# Patient Record
Sex: FEMALE | Race: WHITE
[De-identification: names, ages, dates, MRNs, and addresses within clinical notes are randomized per-mention and may not be internally consistent; named-entity substitution may affect disease eponyms.]

---

## 2018-10-26 ENCOUNTER — HOSPITAL ENCOUNTER (EMERGENCY)
Dept: HOSPITAL 25 - UCCORT | Age: 23
Discharge: HOME | End: 2018-10-26
Payer: COMMERCIAL

## 2018-10-26 VITALS — DIASTOLIC BLOOD PRESSURE: 91 MMHG | SYSTOLIC BLOOD PRESSURE: 132 MMHG

## 2018-10-26 DIAGNOSIS — F17.210: ICD-10-CM

## 2018-10-26 DIAGNOSIS — J20.9: ICD-10-CM

## 2018-10-26 DIAGNOSIS — J06.9: Primary | ICD-10-CM

## 2018-10-26 DIAGNOSIS — Z88.0: ICD-10-CM

## 2018-10-26 PROCEDURE — G0463 HOSPITAL OUTPT CLINIC VISIT: HCPCS

## 2018-10-26 PROCEDURE — 99213 OFFICE O/P EST LOW 20 MIN: CPT

## 2018-10-26 NOTE — UC
Respiratory Complaint HPI





- HPI Summary


HPI Summary: 





The patient is a 23-year-old female that presents here with a 2 day history of 

cough.  Since last night she has felt like she is having a lot of trouble 

breathing.  Her chest feels tight.  She has had bronchitis in the past and has 

had to use an inhaler before.  She denies any fever or chills.  She has no leg 

pain or swelling.  She has not had any myalgias or arthralgias.





- History of Current Complaint


Chief Complaint: UCRespiratory


Stated Complaint: CHEST CONGESTION


Hx Obtained From: Patient


Hx Last Menstrual Period: unknown


Onset/Duration: Gradual Onset, Lasting Days


Timing: Constant


Severity Initially: Mild


Severity Currently: Moderate


Pain Intensity: 0


Pain Scale Used: 0-10 Numeric


Character: Cough: Nonproductive


Aggravating Factors: Nothing


Alleviating Factors: Nothing


Associated Signs And Symptoms: Positive: Wheezing, URI





- Allergies/Home Medications


Allergies/Adverse Reactions: 


 Allergies











Allergy/AdvReac Type Severity Reaction Status Date / Time


 


Penicillins Allergy  Rash Verified 10/26/18 08:14











Home Medications: 


 Home Medications





Dm/PE/Acetaminophen/Doxylamine [Daytime-Nighttime Cold-Flu] 2 each PO BID PRN 10

/26/18 [History Confirmed 10/26/18]











PMH/Surg Hx/FS Hx/Imm Hx


Previously Healthy: Yes


Respiratory History: Bronchitis





- Surgical History


Surgical History: None





- Family History


Known Family History: Positive: Respiratory Disease - sister with asthma, Other 

- denies FMH laceration


   Negative: Diabetes





- Social History


Alcohol Use: Occasionally


Alcohol Amount: 2 time a week


Substance Use Type: None


Smoking Status (MU): Light Every Day Tobacco Smoker


Type: Cigarettes


Amount Used/How Often: 2 cigs/day


Household Exposure Type: Cigarettes





- Immunization History


Most Recent Influenza Vaccination: not 2017





Review of Systems


Constitutional: Negative


Skin: Negative


Eyes: Negative


ENT: Negative


Respiratory: Shortness Of Breath, Cough


Cardiovascular: Negative


Gastrointestinal: Negative


Genitourinary: Negative


Motor: Negative


Neurovascular: Negative


Musculoskeletal: Negative


Neurological: Negative


Psychological: Negative


All Other Systems Reviewed And Are Negative: Yes





Physical Exam


Triage Information Reviewed: Yes


Appearance: Well-Appearing, No Pain Distress, Well-Nourished


Vital Signs: 


 Initial Vital Signs











Temp  97.3 F   10/26/18 08:15


 


Pulse  82   10/26/18 08:15


 


Resp  18   10/26/18 08:15


 


BP  132/91   10/26/18 08:15


 


Pulse Ox  99   10/26/18 08:15











Eye Exam: Normal


ENT: Positive: Hearing grossly normal.  Negative: Nasal congestion, Nasal 

drainage, Tonsillar swelling, Tonsillar exudate, Trismus, Muffled voice, Hoarse 

voice


Neck: Positive: Supple, Nontender, No Lymphadenopathy


Respiratory: Positive: Chest non-tender, Accessory muscle use, Wheezing


Cardiovascular: Positive: RRR, No Murmur


Musculoskeletal: Positive: ROM Intact, No Edema


Neurological: Positive: Alert, Muscle Tone Normal


Psychological Exam: Normal


Skin Exam: Normal





UC Diagnostic Evaluation





- Laboratory


O2 Sat by Pulse Oximetry: 99 - normal/not hypoxic





Re-Evaluation





- Re-Evaluation


  ** First Eval


Re-Evaluation Time: 09:30


Change: Improved - subjectively much improved/lungs clear/better air movement/

no acc mus use





Respiratory Course/Dx





- Differential Dx/Diagnosis


Provider Diagnoses: Viral URI.  Acute Bronchitis with Bronchospasm





Discharge





- Sign-Out/Discharge


Documenting (check all that apply): Patient Departure


All imaging exams completed and their final reports reviewed: No Studies





- Discharge Plan


Condition: Stable


Disposition: HOME


Prescriptions: 


predniSONE [Deltasone 20 MG TAB] 40 mg PO DAILY #10 tab


Patient Education Materials:  Upper Respiratory Infection (ED), Bronchospasm (ED

)


Forms:  *Work Release


Referrals: 


No Primary Care Phys,NOPCP [Primary Care Provider] - 





- Billing Disposition and Condition


Condition: STABLE


Disposition: Home

## 2019-11-24 ENCOUNTER — HOSPITAL ENCOUNTER (EMERGENCY)
Dept: HOSPITAL 25 - UCCORT | Age: 24
Discharge: HOME | End: 2019-11-24
Payer: COMMERCIAL

## 2019-11-24 VITALS — SYSTOLIC BLOOD PRESSURE: 133 MMHG | DIASTOLIC BLOOD PRESSURE: 82 MMHG

## 2019-11-24 DIAGNOSIS — N39.0: Primary | ICD-10-CM

## 2019-11-24 DIAGNOSIS — Z88.0: ICD-10-CM

## 2019-11-24 DIAGNOSIS — M54.5: ICD-10-CM

## 2019-11-24 DIAGNOSIS — F17.210: ICD-10-CM

## 2019-11-24 PROCEDURE — G0463 HOSPITAL OUTPT CLINIC VISIT: HCPCS

## 2019-11-24 PROCEDURE — 87077 CULTURE AEROBIC IDENTIFY: CPT

## 2019-11-24 PROCEDURE — 99212 OFFICE O/P EST SF 10 MIN: CPT

## 2019-11-24 PROCEDURE — 81003 URINALYSIS AUTO W/O SCOPE: CPT

## 2019-11-24 PROCEDURE — 87186 SC STD MICRODIL/AGAR DIL: CPT

## 2019-11-24 PROCEDURE — 87086 URINE CULTURE/COLONY COUNT: CPT

## 2019-11-24 PROCEDURE — 84702 CHORIONIC GONADOTROPIN TEST: CPT

## 2019-11-24 NOTE — UC
Complaint Female HPI





- HPI Summary


HPI Summary: 





24-year-old female who has had urinary burning, frequency and urgency over the 

past 5 days.  Last evening she had some low back pain however that has resolved 

today.  She is sexually active.





- History Of Current Complaint


Chief Complaint: UCGU


Stated Complaint: URINARY


Time Seen by Provider: 11/24/19 15:28


Hx Obtained From: Patient


Hx Last Menstrual Period: unknown


Pregnant?: No


Onset/Duration: Gradual Onset


Timing: Intermittent


Severity Initially: Mild


Severity Currently: Mild


Pain Intensity: 1


Character: Burning


Aggravating Factor(s): Urination


Alleviating Factor(s): Nothing


Associated Signs And Symptoms: Positive: Back Pain - Back pain last evening 

which has resolved.





- Allergies/Home Medications


Allergies/Adverse Reactions: 


 Allergies











Allergy/AdvReac Type Severity Reaction Status Date / Time


 


Penicillins Allergy  Rash Verified 11/24/19 15:35














PMH/Surg Hx/FS Hx/Imm Hx


Previously Healthy: Yes





- Surgical History


Surgical History: None





- Family History


Known Family History: Positive: Unknown, Respiratory Disease - sister with 

asthma, Other - denies FMH laceration


   Negative: Diabetes





- Social History


Alcohol Use: Weekly


Alcohol Amount: 2 time a week


Substance Use Type: None


Smoking Status (MU): Light Every Day Tobacco Smoker


Type: Cigarettes


Amount Used/How Often: <1 pp week


Household Exposure Type: Cigarettes





- Immunization History


Most Recent Influenza Vaccination: not 2017





Review of Systems


All Other Systems Reviewed And Are Negative: Yes


Genitourinary: Positive: Dysuria, Frequency, Urgency


Is Patient Immunocompromised?: No





Physical Exam


Triage Information Reviewed: Yes


Appearance: Well-Appearing, No Pain Distress, Well-Nourished


Vital Signs: 


 Initial Vital Signs











Temp  97.9 F   11/24/19 15:31


 


Pulse  73   11/24/19 15:31


 


Resp  18   11/24/19 15:31


 


BP  133/82   11/24/19 15:31


 


Pulse Ox  99   11/24/19 15:31











Vital Signs Reviewed: Yes


Eyes: Positive: Conjunctiva Clear


ENT: Positive: Hearing grossly normal, Pharynx normal, TMs normal, Uvula midline


Neck: Positive: Supple, Nontender, No Lymphadenopathy


Respiratory: Positive: Lungs clear, Normal breath sounds, No respiratory 

distress, No accessory muscle use


Cardiovascular: Positive: RRR, No Murmur, Pulses Normal, Brisk Capillary Refill


Abdomen Description: Positive: Nontender, Soft.  Negative: CVA Tenderness (R), 

CVA Tenderness (L), Distended, Guarding, Hepatomegaly, Splenomegaly


Bowel Sounds: Positive: Present


Musculoskeletal Exam: Normal


Neurological Exam: Normal


Psychological Exam: Normal


Skin Exam: Normal





 Complaint Female Dx





- Course


Course Of Treatment: 





Urinalysis is positive for leukocytes.  Urine hCG was negative.  I'm going to 

treat with Bactrim DS one tab by mouth twice a day 5 days and Pyridium 3 times 

a day.





- Differential Dx/Diagnosis


Provider Diagnosis: 


 UTI (urinary tract infection)








Discharge ED





- Sign-Out/Discharge


Documenting (check all that apply): Patient Departure


All imaging exams completed and their final reports reviewed: No Studies





- Discharge Plan


Condition: Good


Disposition: HOME


Prescriptions: 


Phenazopyridine TAB* [Pyridium 100 mg TAB*] 100 mg PO TID PRN #6 tab


 PRN Reason: Spasms


Sulfamethox/Trimethoprim DS* [Bactrim /160 TAB*] 1 tab PO BID 5 Days #10 

tab


Patient Education Materials:  Urinary Tract Infection in Women (DC)


Referrals: 


No Primary Care Phys,NOPCP [Primary Care Provider] - 


[facility] [Outside]


Additional Instructions: 


Increase fluids, always urinate after sex, avoid perfumed toilet papers.  Take 

the Bactrim with food.  Do not wear contact lenses while you're using the  

pyridium.  Go to the emergency room if you develop fever, chills, vomiting 

unable keep the medicine.





- Billing Disposition and Condition


Condition: GOOD


Disposition: Home





- Attestation Statements


Provider Attestation: 


I was available for consult. This patient was seen by the OLYA. The patient was 

not presented to , seen by or examined by me -Sean Javier MD